# Patient Record
Sex: FEMALE | Race: WHITE | Employment: UNEMPLOYED | URBAN - METROPOLITAN AREA
[De-identification: names, ages, dates, MRNs, and addresses within clinical notes are randomized per-mention and may not be internally consistent; named-entity substitution may affect disease eponyms.]

---
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
## 2024-01-08 ENCOUNTER — TELEPHONE (OUTPATIENT)
Dept: OBGYN | Age: 24
End: 2024-01-08

## 2024-01-08 NOTE — TELEPHONE ENCOUNTER
Jailene Morales is scheduled for her 1st visit with you on 1/12.  She is due on 1/15/24.  I have requested records from [facility].  Do you want to order an US for her?  Thanks,  Camille

## 2024-01-10 ENCOUNTER — TELEPHONE (OUTPATIENT)
Dept: OBGYN | Age: 24
End: 2024-01-10

## 2024-01-10 DIAGNOSIS — Z3A.39 PREGNANCY WITH 39 COMPLETED WEEKS GESTATION: Primary | ICD-10-CM

## 2024-01-12 ENCOUNTER — INITIAL PRENATAL (OUTPATIENT)
Dept: OBGYN CLINIC | Age: 24
End: 2024-01-12
Payer: COMMERCIAL

## 2024-01-12 ENCOUNTER — HOSPITAL ENCOUNTER (OUTPATIENT)
Age: 24
Discharge: HOME | End: 2024-01-12
Payer: COMMERCIAL

## 2024-01-12 ENCOUNTER — ANCILLARY PROCEDURE (OUTPATIENT)
Dept: OBGYN CLINIC | Age: 24
DRG: 560 | End: 2024-01-12
Payer: COMMERCIAL

## 2024-01-12 ENCOUNTER — INITIAL PRENATAL (OUTPATIENT)
Dept: OBGYN | Age: 24
End: 2024-01-12
Payer: COMMERCIAL

## 2024-01-12 VITALS — WEIGHT: 167 LBS | DIASTOLIC BLOOD PRESSURE: 86 MMHG | SYSTOLIC BLOOD PRESSURE: 125 MMHG | HEART RATE: 109 BPM

## 2024-01-12 VITALS
HEIGHT: 63 IN | SYSTOLIC BLOOD PRESSURE: 117 MMHG | HEART RATE: 95 BPM | DIASTOLIC BLOOD PRESSURE: 84 MMHG | BODY MASS INDEX: 29.62 KG/M2 | WEIGHT: 167.2 LBS

## 2024-01-12 DIAGNOSIS — O09.33 LATE PRENATAL CARE AFFECTING PREGNANCY IN THIRD TRIMESTER: ICD-10-CM

## 2024-01-12 DIAGNOSIS — Z3A.39 39 WEEKS GESTATION OF PREGNANCY: Primary | ICD-10-CM

## 2024-01-12 DIAGNOSIS — Z34.90 SECOND PREGNANCY: ICD-10-CM

## 2024-01-12 DIAGNOSIS — Z3A.39 PREGNANCY WITH 39 COMPLETED WEEKS GESTATION: Primary | ICD-10-CM

## 2024-01-12 LAB
ABO + RH BLD: NORMAL
AMPHET UR QL SCN: NEGATIVE
BARBITURATES UR QL SCN: NEGATIVE
BENZODIAZ UR QL: NEGATIVE
BLOOD BANK SAMPLE EXPIRATION: NORMAL
BLOOD GROUP ANTIBODIES SERPL: NEGATIVE
BUPRENORPHINE UR QL: NEGATIVE
CANNABINOIDS UR QL SCN: NEGATIVE
COCAINE UR QL SCN: NEGATIVE
ERYTHROCYTE [DISTWIDTH] IN BLOOD BY AUTOMATED COUNT: 15.3 % (ref 11.5–15)
FENTANYL UR QL: NEGATIVE
GLUCOSE URINE, POC: NORMAL
HCT VFR BLD AUTO: 31.4 % (ref 34–48)
HGB BLD-MCNC: 9.7 G/DL (ref 11.5–15.5)
MCH RBC QN AUTO: 24.1 PG (ref 26–35)
MCHC RBC AUTO-ENTMCNC: 30.9 G/DL (ref 32–34.5)
MCV RBC AUTO: 78.1 FL (ref 80–99.9)
METHADONE UR QL: NEGATIVE
OPIATES UR QL SCN: NEGATIVE
OXYCODONE UR QL SCN: NEGATIVE
PCP UR QL SCN: NEGATIVE
PLATELET # BLD AUTO: 250 K/UL (ref 130–450)
PMV BLD AUTO: 9.3 FL (ref 7–12)
PROTEIN UA: NEGATIVE
RBC # BLD AUTO: 4.02 M/UL (ref 3.5–5.5)
TEST INFORMATION: NORMAL
WBC OTHER # BLD: 9.3 K/UL (ref 4.5–11.5)

## 2024-01-12 PROCEDURE — 81002 URINALYSIS NONAUTO W/O SCOPE: CPT | Performed by: OBSTETRICS & GYNECOLOGY

## 2024-01-12 PROCEDURE — 36415 COLL VENOUS BLD VENIPUNCTURE: CPT

## 2024-01-12 PROCEDURE — 86762 RUBELLA ANTIBODY: CPT

## 2024-01-12 PROCEDURE — 87340 HEPATITIS B SURFACE AG IA: CPT

## 2024-01-12 PROCEDURE — 99203 OFFICE O/P NEW LOW 30 MIN: CPT | Performed by: OBSTETRICS & GYNECOLOGY

## 2024-01-12 PROCEDURE — 85027 COMPLETE CBC AUTOMATED: CPT

## 2024-01-12 PROCEDURE — 99203 OFFICE O/P NEW LOW 30 MIN: CPT | Performed by: MIDWIFE

## 2024-01-12 PROCEDURE — 86850 RBC ANTIBODY SCREEN: CPT

## 2024-01-12 PROCEDURE — 87491 CHLMYD TRACH DNA AMP PROBE: CPT

## 2024-01-12 PROCEDURE — 87086 URINE CULTURE/COLONY COUNT: CPT

## 2024-01-12 PROCEDURE — 86787 VARICELLA-ZOSTER ANTIBODY: CPT

## 2024-01-12 PROCEDURE — 87389 HIV-1 AG W/HIV-1&-2 AB AG IA: CPT

## 2024-01-12 PROCEDURE — 86803 HEPATITIS C AB TEST: CPT

## 2024-01-12 PROCEDURE — 76820 UMBILICAL ARTERY ECHO: CPT | Performed by: OBSTETRICS & GYNECOLOGY

## 2024-01-12 PROCEDURE — 86901 BLOOD TYPING SEROLOGIC RH(D): CPT

## 2024-01-12 PROCEDURE — 87591 N.GONORRHOEAE DNA AMP PROB: CPT

## 2024-01-12 PROCEDURE — 86592 SYPHILIS TEST NON-TREP QUAL: CPT

## 2024-01-12 PROCEDURE — 76805 OB US >/= 14 WKS SNGL FETUS: CPT | Performed by: OBSTETRICS & GYNECOLOGY

## 2024-01-12 PROCEDURE — 80307 DRUG TEST PRSMV CHEM ANLYZR: CPT

## 2024-01-12 PROCEDURE — 76821 MIDDLE CEREBRAL ARTERY ECHO: CPT | Performed by: OBSTETRICS & GYNECOLOGY

## 2024-01-12 PROCEDURE — 76818 FETAL BIOPHYS PROFILE W/NST: CPT | Performed by: OBSTETRICS & GYNECOLOGY

## 2024-01-12 PROCEDURE — 83020 HEMOGLOBIN ELECTROPHORESIS: CPT

## 2024-01-12 PROCEDURE — 86900 BLOOD TYPING SEROLOGIC ABO: CPT

## 2024-01-12 SDOH — ECONOMIC STABILITY: FOOD INSECURITY: WITHIN THE PAST 12 MONTHS, THE FOOD YOU BOUGHT JUST DIDN'T LAST AND YOU DIDN'T HAVE MONEY TO GET MORE.: SOMETIMES TRUE

## 2024-01-12 SDOH — ECONOMIC STABILITY: HOUSING INSECURITY
IN THE LAST 12 MONTHS, WAS THERE A TIME WHEN YOU DID NOT HAVE A STEADY PLACE TO SLEEP OR SLEPT IN A SHELTER (INCLUDING NOW)?: NO

## 2024-01-12 SDOH — ECONOMIC STABILITY: FOOD INSECURITY: WITHIN THE PAST 12 MONTHS, YOU WORRIED THAT YOUR FOOD WOULD RUN OUT BEFORE YOU GOT MONEY TO BUY MORE.: SOMETIMES TRUE

## 2024-01-12 SDOH — ECONOMIC STABILITY: INCOME INSECURITY: HOW HARD IS IT FOR YOU TO PAY FOR THE VERY BASICS LIKE FOOD, HOUSING, MEDICAL CARE, AND HEATING?: SOMEWHAT HARD

## 2024-01-12 ASSESSMENT — PATIENT HEALTH QUESTIONNAIRE - PHQ9
SUM OF ALL RESPONSES TO PHQ QUESTIONS 1-9: 0
1. LITTLE INTEREST OR PLEASURE IN DOING THINGS: 0
SUM OF ALL RESPONSES TO PHQ9 QUESTIONS 1 & 2: 0
SUM OF ALL RESPONSES TO PHQ QUESTIONS 1-9: 0
2. FEELING DOWN, DEPRESSED OR HOPELESS: 0

## 2024-01-12 NOTE — PROGRESS NOTES
HISTORY OF PRESENT ILLNESS:  Jailene Kinney is a 23 y.o. female , Patient's last menstrual period was 2023.,  at 39w4d.   No prenatal care.  She went to a clinic in [place] and had a positive pregnancy test to get proof of pregnancy.  \"This year has been complicated\".  Spent some time out of state, didn't elaborate on that.  Has a 5-year-old son and has custody of him.  Partner present and appears supportive.  History of poor growth in 1st pregnancy but baby was 7#12.  12 hour labor without complications.  Denies postpartum hemorrhage (she is a redhead).  No  medications  No hx HSV  We discussed sending her to L&D today for induction, she doesn't want to do that, she is agreeable to induction on Monday.  Scheduled for 8am.  Discussed fetal kick counts at length and when to call  Agreeable to getting prenatal labs done today  GBS collected  Cervix 2/60/-2/anterior/soft.  Having some irregular contractions, not painful.   Had US and NST today at [facility]    Pregnancy complicated by:   Patient Active Problem List   Diagnosis Code    Maternal care for poor fetal growth, second trimester, not applicable or unspecified fetus O36.5920    Young primigravida in second trimester O09.612       PAST OB HISTORY  OB History          2    Para   1    Term   1            AB        Living   1         SAB        IAB        Ectopic        Molar        Multiple        Live Births   1                Past Medical History:          Diagnosis Date    Depression     Migraine        Past Surgical History:      History reviewed. No pertinent surgical history.    Social History:    TOBACCO:   reports that she has never smoked. She has never used smokeless tobacco.  ETOH:   reports no history of alcohol use.  DRUGS:   reports no history of drug use.  Family History:       Problem Relation Age of Onset    Transient ischemic attack  Mother        Medications Prior to Admission:  Not in a hospital admission.    Allergies:

## 2024-01-12 NOTE — PATIENT INSTRUCTIONS
Information about free and low-cost medicine programs.  Website: https://www.rxassist.org/      [employer] $4 Prescription Program  What they offer: Prescription Program includes up to a 30-day supply for $4 and a 90-day supply for $10 of some covered generic drugs at commonly prescribed dosages  Website: https://www.Progression/cp/4-prescriptions/9553461    [facility] Prescription Assistance Program  Phone: [phone]

## 2024-01-12 NOTE — PROGRESS NOTES
Patient is here today for ob new visit. Denies any vaginal bleeding, or leakage of fluids.  Cramping on and off.  Patient reports good fetal movement.

## 2024-01-12 NOTE — PROGRESS NOTES
OB new patient late prenatal care. Had U/S at [facility] today. Records requested at [facility]. Urine for glucose and protein obtained with negative results at [facility] appt.   Fetal heart tones obtained without difficulty. Culture obtained, labeled and sent to lab.  Discharge instructions given and patient directed to call the office with any questions or concerns and verbalized understanding.

## 2024-01-13 LAB
MICROORGANISM SPEC CULT: NO GROWTH
SPECIMEN DESCRIPTION: NORMAL

## 2024-01-15 ENCOUNTER — ANESTHESIA (OUTPATIENT)
Dept: LABOR AND DELIVERY | Age: 24
End: 2024-01-15

## 2024-01-15 ENCOUNTER — ANESTHESIA EVENT (OUTPATIENT)
Dept: LABOR AND DELIVERY | Age: 24
End: 2024-01-15

## 2024-01-15 ENCOUNTER — APPOINTMENT (OUTPATIENT)
Dept: LABOR AND DELIVERY | Age: 24
DRG: 560 | End: 2024-01-15
Payer: COMMERCIAL

## 2024-01-15 ENCOUNTER — HOSPITAL ENCOUNTER (INPATIENT)
Age: 24
LOS: 2 days | Discharge: HOME | DRG: 560 | End: 2024-01-17
Attending: OBSTETRICS & GYNECOLOGY | Admitting: OBSTETRICS & GYNECOLOGY
Payer: COMMERCIAL

## 2024-01-15 PROBLEM — O47.9 UTERINE CONTRACTIONS: Status: ACTIVE | Noted: 2024-01-15

## 2024-01-15 PROBLEM — O09.33 LIMITED PRENATAL CARE IN THIRD TRIMESTER: Status: ACTIVE | Noted: 2024-01-15

## 2024-01-15 PROBLEM — Z3A.40 40 WEEKS GESTATION OF PREGNANCY: Status: ACTIVE | Noted: 2024-01-15

## 2024-01-15 LAB
ABO + RH BLD: NORMAL
AMPHET UR QL SCN: NEGATIVE
ARM BAND NUMBER: NORMAL
BARBITURATES UR QL SCN: NEGATIVE
BENZODIAZ UR QL: NEGATIVE
BLOOD BANK SAMPLE EXPIRATION: NORMAL
BLOOD GROUP ANTIBODIES SERPL: NEGATIVE
BUPRENORPHINE UR QL: NEGATIVE
CANNABINOIDS UR QL SCN: NEGATIVE
COCAINE UR QL SCN: NEGATIVE
ERYTHROCYTE [DISTWIDTH] IN BLOOD BY AUTOMATED COUNT: 15.9 % (ref 11.5–15)
FENTANYL UR QL: NEGATIVE
HBV SURFACE AG SERPL QL IA: NONREACTIVE
HCT VFR BLD AUTO: 26.6 % (ref 34–48)
HCV AB SERPL QL IA: NONREACTIVE
HGB BLD-MCNC: 8.4 G/DL (ref 11.5–15.5)
HGB ELECTROPHORESIS INTERP: NORMAL
HIV 1+2 AB+HIV1 P24 AG SERPL QL IA: NONREACTIVE
MCH RBC QN AUTO: 24.3 PG (ref 26–35)
MCHC RBC AUTO-ENTMCNC: 31.6 G/DL (ref 32–34.5)
MCV RBC AUTO: 76.9 FL (ref 80–99.9)
METHADONE UR QL: NEGATIVE
OPIATES UR QL SCN: NEGATIVE
OXYCODONE UR QL SCN: NEGATIVE
PATHOLOGIST: NORMAL
PCP UR QL SCN: NEGATIVE
PLATELET # BLD AUTO: 254 K/UL (ref 130–450)
PMV BLD AUTO: 9.6 FL (ref 7–12)
RBC # BLD AUTO: 3.46 M/UL (ref 3.5–5.5)
RPR SER QL: NONREACTIVE
RUBV IGG SERPL QL IA: ABNORMAL IU/ML
TEST INFORMATION: NORMAL
VZV IGG SER QL IA: ABNORMAL
WBC OTHER # BLD: 7.2 K/UL (ref 4.5–11.5)

## 2024-01-15 PROCEDURE — 85027 COMPLETE CBC AUTOMATED: CPT

## 2024-01-15 PROCEDURE — 1220000001 HC SEMI PRIVATE L&D R&B

## 2024-01-15 PROCEDURE — 80307 DRUG TEST PRSMV CHEM ANLYZR: CPT

## 2024-01-15 PROCEDURE — 86900 BLOOD TYPING SEROLOGIC ABO: CPT

## 2024-01-15 PROCEDURE — 86901 BLOOD TYPING SEROLOGIC RH(D): CPT

## 2024-01-15 PROCEDURE — 99221 1ST HOSP IP/OBS SF/LOW 40: CPT | Performed by: PHYSICIAN ASSISTANT

## 2024-01-15 PROCEDURE — 6360000002 HC RX W HCPCS: Performed by: OBSTETRICS & GYNECOLOGY

## 2024-01-15 PROCEDURE — 2580000003 HC RX 258: Performed by: OBSTETRICS & GYNECOLOGY

## 2024-01-15 PROCEDURE — 86850 RBC ANTIBODY SCREEN: CPT

## 2024-01-15 RX ORDER — CARBOPROST TROMETHAMINE 250 UG/ML
250 INJECTION, SOLUTION INTRAMUSCULAR PRN
Status: DISCONTINUED | OUTPATIENT
Start: 2024-01-15 | End: 2024-01-17 | Stop reason: HOSPADM

## 2024-01-15 RX ORDER — ONDANSETRON 2 MG/ML
4 INJECTION INTRAMUSCULAR; INTRAVENOUS EVERY 6 HOURS PRN
Status: DISCONTINUED | OUTPATIENT
Start: 2024-01-15 | End: 2024-01-16 | Stop reason: HOSPADM

## 2024-01-15 RX ORDER — SODIUM CHLORIDE, SODIUM LACTATE, POTASSIUM CHLORIDE, AND CALCIUM CHLORIDE .6; .31; .03; .02 G/100ML; G/100ML; G/100ML; G/100ML
1000 INJECTION, SOLUTION INTRAVENOUS PRN
Status: DISCONTINUED | OUTPATIENT
Start: 2024-01-15 | End: 2024-01-17 | Stop reason: HOSPADM

## 2024-01-15 RX ORDER — SODIUM CHLORIDE, SODIUM LACTATE, POTASSIUM CHLORIDE, CALCIUM CHLORIDE 600; 310; 30; 20 MG/100ML; MG/100ML; MG/100ML; MG/100ML
INJECTION, SOLUTION INTRAVENOUS CONTINUOUS
Status: DISCONTINUED | OUTPATIENT
Start: 2024-01-15 | End: 2024-01-17 | Stop reason: HOSPADM

## 2024-01-15 RX ORDER — SODIUM CHLORIDE 9 MG/ML
25 INJECTION, SOLUTION INTRAVENOUS PRN
Status: DISCONTINUED | OUTPATIENT
Start: 2024-01-15 | End: 2024-01-17 | Stop reason: HOSPADM

## 2024-01-15 RX ORDER — SODIUM CHLORIDE, SODIUM LACTATE, POTASSIUM CHLORIDE, AND CALCIUM CHLORIDE .6; .31; .03; .02 G/100ML; G/100ML; G/100ML; G/100ML
500 INJECTION, SOLUTION INTRAVENOUS PRN
Status: DISCONTINUED | OUTPATIENT
Start: 2024-01-15 | End: 2024-01-17 | Stop reason: HOSPADM

## 2024-01-15 RX ORDER — PHYTONADIONE 1 MG/.5ML
INJECTION, EMULSION INTRAMUSCULAR; INTRAVENOUS; SUBCUTANEOUS
Status: DISPENSED
Start: 2024-01-15 | End: 2024-01-16

## 2024-01-15 RX ORDER — METHYLERGONOVINE MALEATE 0.2 MG/ML
200 INJECTION INTRAVENOUS PRN
Status: DISCONTINUED | OUTPATIENT
Start: 2024-01-15 | End: 2024-01-17 | Stop reason: HOSPADM

## 2024-01-15 RX ORDER — NALOXONE HYDROCHLORIDE 0.4 MG/ML
INJECTION, SOLUTION INTRAMUSCULAR; INTRAVENOUS; SUBCUTANEOUS PRN
Status: DISCONTINUED | OUTPATIENT
Start: 2024-01-15 | End: 2024-01-16 | Stop reason: HOSPADM

## 2024-01-15 RX ORDER — LABETALOL HYDROCHLORIDE 5 MG/ML
INJECTION, SOLUTION INTRAVENOUS
Status: DISPENSED
Start: 2024-01-15 | End: 2024-01-16

## 2024-01-15 RX ORDER — ERYTHROMYCIN 5 MG/G
OINTMENT OPHTHALMIC
Status: DISPENSED
Start: 2024-01-15 | End: 2024-01-16

## 2024-01-15 RX ORDER — ONDANSETRON 2 MG/ML
4 INJECTION INTRAMUSCULAR; INTRAVENOUS EVERY 6 HOURS PRN
Status: DISCONTINUED | OUTPATIENT
Start: 2024-01-15 | End: 2024-01-17 | Stop reason: HOSPADM

## 2024-01-15 RX ORDER — MISOPROSTOL 200 UG/1
800 TABLET ORAL PRN
Status: DISCONTINUED | OUTPATIENT
Start: 2024-01-15 | End: 2024-01-17 | Stop reason: HOSPADM

## 2024-01-15 RX ADMIN — SODIUM CHLORIDE, POTASSIUM CHLORIDE, SODIUM LACTATE AND CALCIUM CHLORIDE: 600; 310; 30; 20 INJECTION, SOLUTION INTRAVENOUS at 13:21

## 2024-01-15 RX ADMIN — Medication 1 MILLI-UNITS/MIN: at 15:41

## 2024-01-15 NOTE — H&P
Department of Obstetrics and Gynecology  Physician Assistant Obstetrics History and Physical      HISTORY OF PRESENT ILLNESS:      The patient is a 23 y.o.  2 parity 1 at 40 weeks' 0 days' gestation presents to L&D from home for scheduled IOL. She went to a clinic in [place] and had a positive pregnancy test to get proof of pregnancy. One prenatal visit 2024. History of poor fetal growth in first pregnancy, but baby was 7#12oz..    Current obstetric history is significant for:  1 prenatal visit  Anemia    Estimated Due Date:  01/15/2024  Contractions: Yes  Leaking of fluid: No  Bleeding:  No  Perceived fetal movement: Good        PAST OB HISTORY:  OB History    Para Term  AB Living   2 1 1     1   SAB IAB Ectopic Molar Multiple Live Births             1      # Outcome Date GA Lbr Dandre/2nd Weight Sex Delivery Anes PTL Lv   2 Current            1 Term 03/10/18 40w0d  3.515 kg (7 lb 12 oz) M Vag-Spont   KAYLI           Pre-eclampsia:  No      :  No      D & C:  No      Cerclage:  No      LEEP:  No      Myomectomy:  No       Labor: No    Past Medical History:  Denies hypertension, diabetes, asthma, cardiac or thyroid disease  Diagnosis Date    Depression     Migraine         Past Surgical History:    No past surgical history on file.     Social History:    Reports that she has never smoked. She has never used smokeless tobacco. She reports that she does not drink alcohol and does not use drugs.     Family History   Problem Relation Age of Onset    Transient ischemic attack  Mother        Blood type: pending  Antibody screen:   Lab Results   Component Value Date    LABANTI PENDING 01/15/2024     CBC:   Lab Results   Component Value Date    WBC 7.2 01/15/2024    HGB 8.4 (L) 01/15/2024    HCT 26.6 (L) 01/15/2024    MCV 76.9 (L) 01/15/2024     01/15/2024      Rubella: EQUIVOCAL  RPR: Non-reactive  Hepatitis B Surface Antigen: Non-reactive  Lab Results   Component Value Date

## 2024-01-15 NOTE — PROGRESS NOTES
40w0d here for IOL. Patient denies LOF, VB,CTX's. Good fetal movement.   Patient's first prenatal care appointment was 24.

## 2024-01-15 NOTE — ANESTHESIA PRE PROCEDURE
Department of Anesthesiology  Preprocedure Note       Name:  Jailene Kinney   Age:  23 y.o.  :  2000                                          MRN:  [id number]         Date:  1/15/2024      Surgeon: * No surgeons listed *    Procedure: * No procedures listed *    Medications prior to admission:   Prior to Admission medications    Medication Sig Start Date End Date Taking? Authorizing Provider   Prenatal Multivit-Min-Fe-FA (PRENATAL VITAMINS PO) Take by mouth    Provider, MD Kei       Current medications:    Current Facility-Administered Medications   Medication Dose Route Frequency Provider Last Rate Last Admin   • lactated ringers IV soln infusion   IntraVENous Continuous Hanigosky, Mandy, DO       • lactated ringers bolus bolus 500 mL  500 mL IntraVENous PRN Hanigosky, Mandy, DO        Or   • lactated ringers bolus bolus 1,000 mL  1,000 mL IntraVENous PRN Hanigosky, Mandy, DO       • 0.9 % sodium chloride infusion  25 mL IntraVENous PRN Hanigosky, Mandy, DO       • methylergonovine (METHERGINE) injection 200 mcg  200 mcg IntraMUSCular PRN Hanigosky, Mandy, DO       • carboprost (HEMABATE) injection 250 mcg  250 mcg IntraMUSCular PRN Hanigosky, Mandy, DO       • miSOPROStol (CYTOTEC) tablet 800 mcg  800 mcg Rectal PRN Hanigosky, Mandy, DO       • tranexamic acid (CYKLOKAPRON) 1,000 mg in sodium chloride 0.9 % 100 mL IVPB  1,000 mg IntraVENous Once PRN Hanigosky, Mandy, DO       • oxytocin (PITOCIN) 15 units in 250 mL infusion  87.3 mat-units/min IntraVENous Continuous PRN Hanigosky, Mandy, DO        And   • oxytocin (PITOCIN) 10 unit bolus from the bag  10 Units IntraVENous PRN Hanigosky, Mandy, DO       • ondansetron (ZOFRAN) injection 4 mg  4 mg IntraVENous Q6H PRN Hanigosky, Mandy, DO       • naloxone 0.4 mg in 10 mL sodium chloride syringe   IntraVENous PRN Cristina Way, DO       • ondansetron (ZOFRAN) injection 4 mg  4 mg IntraVENous Q6H PRN Rayna, Cristina, DO

## 2024-01-16 LAB
CHLAMYDIA DNA UR QL NAA+PROBE: NEGATIVE
CULTURE: NORMAL
N GONORRHOEA DNA UR QL NAA+PROBE: NEGATIVE
SPECIMEN DESCRIPTION: NORMAL
SPECIMEN DESCRIPTION: NORMAL

## 2024-01-16 PROCEDURE — 7200000001 HC VAGINAL DELIVERY

## 2024-01-16 PROCEDURE — 90715 TDAP VACCINE 7 YRS/> IM: CPT | Performed by: MIDWIFE

## 2024-01-16 PROCEDURE — 10907ZC DRAINAGE OF AMNIOTIC FLUID, THERAPEUTIC FROM PRODUCTS OF CONCEPTION, VIA NATURAL OR ARTIFICIAL OPENING: ICD-10-PCS | Performed by: MIDWIFE

## 2024-01-16 PROCEDURE — 90471 IMMUNIZATION ADMIN: CPT | Performed by: MIDWIFE

## 2024-01-16 PROCEDURE — 1220000000 HC SEMI PRIVATE OB R&B

## 2024-01-16 PROCEDURE — 2580000003 HC RX 258: Performed by: MIDWIFE

## 2024-01-16 PROCEDURE — 6360000002 HC RX W HCPCS: Performed by: OBSTETRICS & GYNECOLOGY

## 2024-01-16 PROCEDURE — 85461 HEMOGLOBIN FETAL: CPT

## 2024-01-16 PROCEDURE — 6360000002 HC RX W HCPCS: Performed by: MIDWIFE

## 2024-01-16 PROCEDURE — 6360000002 HC RX W HCPCS

## 2024-01-16 PROCEDURE — 2580000003 HC RX 258: Performed by: OBSTETRICS & GYNECOLOGY

## 2024-01-16 PROCEDURE — 59409 OBSTETRICAL CARE: CPT | Performed by: MIDWIFE

## 2024-01-16 PROCEDURE — 6370000000 HC RX 637 (ALT 250 FOR IP): Performed by: MIDWIFE

## 2024-01-16 RX ORDER — LIDOCAINE HYDROCHLORIDE 10 MG/ML
INJECTION, SOLUTION INFILTRATION; PERINEURAL
Status: DISPENSED
Start: 2024-01-16 | End: 2024-01-16

## 2024-01-16 RX ORDER — SODIUM CHLORIDE 0.9 % (FLUSH) 0.9 %
5-40 SYRINGE (ML) INJECTION PRN
Status: DISCONTINUED | OUTPATIENT
Start: 2024-01-16 | End: 2024-01-17 | Stop reason: HOSPADM

## 2024-01-16 RX ORDER — DOCUSATE SODIUM 100 MG/1
100 CAPSULE, LIQUID FILLED ORAL 2 TIMES DAILY
Status: DISCONTINUED | OUTPATIENT
Start: 2024-01-16 | End: 2024-01-17 | Stop reason: HOSPADM

## 2024-01-16 RX ORDER — SODIUM CHLORIDE 0.9 % (FLUSH) 0.9 %
5-40 SYRINGE (ML) INJECTION EVERY 12 HOURS SCHEDULED
Status: DISCONTINUED | OUTPATIENT
Start: 2024-01-16 | End: 2024-01-17 | Stop reason: HOSPADM

## 2024-01-16 RX ORDER — IBUPROFEN 800 MG/1
800 TABLET ORAL EVERY 8 HOURS SCHEDULED
Status: DISCONTINUED | OUTPATIENT
Start: 2024-01-16 | End: 2024-01-17 | Stop reason: HOSPADM

## 2024-01-16 RX ORDER — SODIUM CHLORIDE 9 MG/ML
INJECTION, SOLUTION INTRAVENOUS PRN
Status: DISCONTINUED | OUTPATIENT
Start: 2024-01-16 | End: 2024-01-17 | Stop reason: HOSPADM

## 2024-01-16 RX ORDER — MODIFIED LANOLIN
OINTMENT (GRAM) TOPICAL PRN
Status: DISCONTINUED | OUTPATIENT
Start: 2024-01-16 | End: 2024-01-17 | Stop reason: HOSPADM

## 2024-01-16 RX ORDER — ACETAMINOPHEN 500 MG
1000 TABLET ORAL EVERY 8 HOURS PRN
Status: DISCONTINUED | OUTPATIENT
Start: 2024-01-16 | End: 2024-01-17 | Stop reason: HOSPADM

## 2024-01-16 RX ADMIN — SODIUM CHLORIDE 200 MG: 9 INJECTION, SOLUTION INTRAVENOUS at 10:18

## 2024-01-16 RX ADMIN — Medication 166.7 ML: at 01:47

## 2024-01-16 RX ADMIN — Medication 2 MG: at 00:24

## 2024-01-16 RX ADMIN — DOCUSATE SODIUM 100 MG: 100 CAPSULE, LIQUID FILLED ORAL at 08:32

## 2024-01-16 RX ADMIN — BUTORPHANOL TARTRATE 2 MG: 2 INJECTION, SOLUTION INTRAMUSCULAR; INTRAVENOUS at 00:24

## 2024-01-16 RX ADMIN — SODIUM CHLORIDE, PRESERVATIVE FREE 10 ML: 5 INJECTION INTRAVENOUS at 08:33

## 2024-01-16 RX ADMIN — TETANUS TOXOID, REDUCED DIPHTHERIA TOXOID AND ACELLULAR PERTUSSIS VACCINE, ADSORBED 0.5 ML: 5; 2.5; 8; 8; 2.5 SUSPENSION INTRAMUSCULAR at 09:50

## 2024-01-16 RX ADMIN — IBUPROFEN 800 MG: 800 TABLET, FILM COATED ORAL at 22:02

## 2024-01-16 RX ADMIN — IBUPROFEN 800 MG: 800 TABLET, FILM COATED ORAL at 13:47

## 2024-01-16 RX ADMIN — SODIUM CHLORIDE, PRESERVATIVE FREE 10 ML: 5 INJECTION INTRAVENOUS at 10:18

## 2024-01-16 RX ADMIN — DOCUSATE SODIUM 100 MG: 100 CAPSULE, LIQUID FILLED ORAL at 22:02

## 2024-01-16 RX ADMIN — Medication 87.3 MILLI-UNITS/MIN: at 01:58

## 2024-01-16 ASSESSMENT — PAIN DESCRIPTION - DESCRIPTORS
DESCRIPTORS: ACHING;DISCOMFORT;SORE
DESCRIPTORS: CRAMPING;DISCOMFORT

## 2024-01-16 ASSESSMENT — PAIN DESCRIPTION - ORIENTATION
ORIENTATION: LOWER
ORIENTATION: LOWER

## 2024-01-16 ASSESSMENT — PAIN SCALES - GENERAL
PAINLEVEL_OUTOF10: 7
PAINLEVEL_OUTOF10: 2
PAINLEVEL_OUTOF10: 3
PAINLEVEL_OUTOF10: 1

## 2024-01-16 ASSESSMENT — PAIN - FUNCTIONAL ASSESSMENT
PAIN_FUNCTIONAL_ASSESSMENT: ACTIVITIES ARE NOT PREVENTED
PAIN_FUNCTIONAL_ASSESSMENT: ACTIVITIES ARE NOT PREVENTED

## 2024-01-16 ASSESSMENT — PAIN DESCRIPTION - LOCATION
LOCATION: ABDOMEN;PERINEUM
LOCATION: ABDOMEN

## 2024-01-16 NOTE — PLAN OF CARE
Problem: Postpartum  Goal: Experiences normal postpartum course  Description:  Postpartum OB-Pregnancy care plan goal which identifies if the mother is experiencing a normal postpartum course  Outcome: Progressing  Goal: Appropriate maternal -  bonding  Description:  Postpartum OB-Pregnancy care plan goal which identifies if the mother and  are bonding appropriately  Outcome: Progressing  Goal: Establishment of infant feeding pattern  Description:  Postpartum OB-Pregnancy care plan goal which identifies if the mother is establishing a feeding pattern with their   Outcome: Progressing  Goal: Incisions, wounds, or drain sites healing without S/S of infection  Outcome: Progressing     Problem: Pain  Goal: Verbalizes/displays adequate comfort level or baseline comfort level  Outcome: Progressing     Problem: Safety - Adult  Goal: Free from fall injury  Outcome: Progressing

## 2024-01-16 NOTE — PROGRESS NOTES
Delivery of a viable baby boy via  at 0138. Apgars 8/8, vitals stable. Infant placed skin to skin with patient.

## 2024-01-16 NOTE — PROGRESS NOTES
Dad laying on couch holding baby, reminded dad to please put baby in bassinet if he becomes tired. Dad and mom verbalized understanding.

## 2024-01-16 NOTE — PROGRESS NOTES
Jailene Kinney is a 23 y.o. female, , Patient's last menstrual period was 2023., Estimated Date of Delivery: 1/15/24, 40w0d.    Called to perform AROM on this patient.   History reviewed and there is no significant change except:   GBS is negative ,   Pitocin: 4, Contractions are every 2-3 min, FH: 135    VAGINAL EXAM:    Cervical dilatation: 5, Effacement: 90%, Presentation: vertex, Station: -2.    AROM performed.  Amniotic Fluid: clear

## 2024-01-16 NOTE — PROGRESS NOTES
Assumed patient care. Patient resting comfortably in bed. Denies needs or complaints at this time. Efm adjusted, call light within reach.

## 2024-01-16 NOTE — FLOWSHEET NOTE
Patient admitted to room 307 with father of infant at bedside. Patient oriented to room and floor and instructed to call phone number provided or use call light PRN. Admission packet and infant safety protocols and safe sleep reviewed with verbalized understanding. Patient is now resting in bed and infant taken to nursery.

## 2024-01-16 NOTE — L&D DELIVERY NOTE
José Kinney [[id number]]      Labor Events      Cervical Ripening Date/Time:        Rupture Date/Time:  1/15/24 22:44:00   Rupture Type: AROM, Intact  Fluid Color: Clear  Fluid Odor: None              Delivery Details      Delivery Date: 24 Delivery Time: 01:38:00                 Cord                  Placenta           Lacerations           Blood Loss  Mother: Jailene Kinney #[id number]     Start of Mother's Information      Delivery Blood Loss  01/15/24 1338 - 24 0158      None                 End of Mother's Information  Mother: Jailene Kinney #[id number]                Delivery Providers    Delivering clinician: Bridget Benedict APRN - CNM     Provider Role     Obstetrician    Robyn Heller, ALLISON Primary Nurse    Alisha Hugo RN Primary Pease Nurse     NICU Nurse     Neonatologist     Anesthesiologist     Nurse Anesthetist     Nurse Practitioner     Midwife     Nursery Nurse              Pease Assessment    Living Status: Living  Delivery Location Comment: LD5        Skin Color:   Heart Rate:   Reflex Irritability:   Muscle Tone:   Respiratory Effort:   Total:            1 Minute:    0    2    2    2    2    8         5 Minute:    0    2    2    2    2    8                                        Apgars Assigned By: SHERRI COOPER              Resuscitation    Method: Bulb Suction, Stimulation             Pease Measurements               Skin to Skin      Skin to Skin Initiation Date/Time: 24 01:39:00 EST     Skin to Skin With: Mother                Department of Obstetrics and Gynecology  Spontaneous Vaginal Delivery Note      Pre-operative Diagnosis:  Term pregnancy and Late Prenatal Care    Post-operative Diagnosis:  Living  infant(s) and Male    Infant Wt:   Birth Weight: N/A       APGARS:     APGAR One: N/A  APGAR Five: N/A       Anesthesia:  none      Delivery Summary: Spontaneous vaginal delivery of a male baby Nucahl x1, reduced, over intact perineum at 0138.   Airways of the baby cleared of all secretion with suction bulb as soon as the head was out from the vagina.  Placenta and menbranes, complete and normal were delivered at 0148.   Perineum: intact  Mother and Baby are in good condition.         Specimen:  cord gases      Estimated blood loss: 300cc              Condition:  mother and infant stable, skin to skin    Blood Type and Rh: A NEGATIVE        Rubella Immunity Status:   equivocal           Infant Feeding:    breast    Attending Attestation: I was present and scrubbed for the entire procedure.    MADAN Alcantara - MISAEL

## 2024-01-16 NOTE — LACTATION NOTE
Encouraged skin to skin and frequent attempts at breast to stimulate milk production. Instructed on normal infant behavior in the first 12-24 hours and importance of stimulating the baby frequently to eat during this time. Reviewed hand expression, and encouraged to hand express drops of colostrum when baby is sleepy. Instructed that baby may also feed 8-12 times a day- cluster feeding at times- as her milk supply is being established.  Instructed on benefits of skin to skin and avoidance of pacifier / artificial nipple use until breastfeeding is well established.  Educated on making sure infant has an open airway while breastfeeding and skin to skin. Instructed on hunger cues and waking techniques to try. Reviewed signs of adequate I & O; allow baby to feed ad storm and not to limit time at breast. Breastfeeding booklet provided with review of its contents. Offered assistance with latch, but patient declined. Encouraged to call with any concerns or for future feeds.    SANJUANITA Alfaro

## 2024-01-16 NOTE — PROGRESS NOTES
MISAEL Benedict updated on SVE, ctx pattern, and patient preferences for pain management. RN to continue current plan of care.

## 2024-01-16 NOTE — PROGRESS NOTES
Patient ambulatory to bathroom with standby. Unable to void at this time. Leatha care provided. Transferred to mom baby via wheelchair at this time.

## 2024-01-17 VITALS
HEART RATE: 81 BPM | TEMPERATURE: 97.7 F | DIASTOLIC BLOOD PRESSURE: 67 MMHG | WEIGHT: 167 LBS | OXYGEN SATURATION: 96 % | SYSTOLIC BLOOD PRESSURE: 106 MMHG | RESPIRATION RATE: 16 BRPM | BODY MASS INDEX: 29.59 KG/M2 | HEIGHT: 63 IN

## 2024-01-17 LAB
ERYTHROCYTE [DISTWIDTH] IN BLOOD BY AUTOMATED COUNT: 16.6 % (ref 11.5–15)
HCT VFR BLD AUTO: 25.8 % (ref 34–48)
HGB BLD-MCNC: 7.6 G/DL (ref 11.5–15.5)
MCH RBC QN AUTO: 23.7 PG (ref 26–35)
MCHC RBC AUTO-ENTMCNC: 29.5 G/DL (ref 32–34.5)
MCV RBC AUTO: 80.4 FL (ref 80–99.9)
PLATELET # BLD AUTO: 241 K/UL (ref 130–450)
PMV BLD AUTO: 9.4 FL (ref 7–12)
RBC # BLD AUTO: 3.21 M/UL (ref 3.5–5.5)
RESULT: NEGATIVE
WBC OTHER # BLD: 12.5 K/UL (ref 4.5–11.5)

## 2024-01-17 PROCEDURE — 2580000003 HC RX 258: Performed by: MIDWIFE

## 2024-01-17 PROCEDURE — 6360000002 HC RX W HCPCS: Performed by: OBSTETRICS & GYNECOLOGY

## 2024-01-17 PROCEDURE — 36415 COLL VENOUS BLD VENIPUNCTURE: CPT

## 2024-01-17 PROCEDURE — 85027 COMPLETE CBC AUTOMATED: CPT

## 2024-01-17 PROCEDURE — 2580000003 HC RX 258: Performed by: OBSTETRICS & GYNECOLOGY

## 2024-01-17 PROCEDURE — 96372 THER/PROPH/DIAG INJ SC/IM: CPT

## 2024-01-17 PROCEDURE — 6370000000 HC RX 637 (ALT 250 FOR IP): Performed by: MIDWIFE

## 2024-01-17 RX ORDER — IBUPROFEN 800 MG/1
800 TABLET ORAL EVERY 8 HOURS PRN
Qty: 24 TABLET | Refills: 0 | Status: SHIPPED | OUTPATIENT
Start: 2024-01-17

## 2024-01-17 RX ORDER — FERROUS SULFATE 325(65) MG
325 TABLET ORAL
Qty: 90 TABLET | Refills: 0 | Status: SHIPPED | OUTPATIENT
Start: 2024-01-17

## 2024-01-17 RX ADMIN — RHO(D) IMMUNE GLOBULIN (HUMAN) 300 MCG: 1500 SOLUTION INTRAMUSCULAR at 12:54

## 2024-01-17 RX ADMIN — IBUPROFEN 800 MG: 800 TABLET, FILM COATED ORAL at 11:13

## 2024-01-17 RX ADMIN — SODIUM CHLORIDE, PRESERVATIVE FREE 10 ML: 5 INJECTION INTRAVENOUS at 11:49

## 2024-01-17 RX ADMIN — SODIUM CHLORIDE 200 MG: 9 INJECTION, SOLUTION INTRAVENOUS at 12:12

## 2024-01-17 RX ADMIN — DOCUSATE SODIUM 100 MG: 100 CAPSULE, LIQUID FILLED ORAL at 11:14

## 2024-01-17 ASSESSMENT — PAIN DESCRIPTION - DESCRIPTORS: DESCRIPTORS: CRAMPING;DISCOMFORT;SORE

## 2024-01-17 ASSESSMENT — PAIN SCALES - GENERAL: PAINLEVEL_OUTOF10: 2

## 2024-01-17 ASSESSMENT — PAIN DESCRIPTION - ORIENTATION: ORIENTATION: LOWER

## 2024-01-17 ASSESSMENT — PAIN - FUNCTIONAL ASSESSMENT: PAIN_FUNCTIONAL_ASSESSMENT: ACTIVITIES ARE NOT PREVENTED

## 2024-01-17 ASSESSMENT — PAIN DESCRIPTION - ONSET: ONSET: GRADUAL

## 2024-01-17 ASSESSMENT — PAIN DESCRIPTION - PAIN TYPE: TYPE: ACUTE PAIN

## 2024-01-17 ASSESSMENT — PAIN DESCRIPTION - LOCATION: LOCATION: ABDOMEN

## 2024-01-17 ASSESSMENT — PAIN DESCRIPTION - FREQUENCY: FREQUENCY: INTERMITTENT

## 2024-01-17 NOTE — FLOWSHEET NOTE
This RN left confidential voicemail for DELON Ricketts notifying her of patient's PPD score of 10 today. Also notified DELON Ricketts that this RN will notify Dr. Olguin of score prior to discharge today.

## 2024-01-17 NOTE — LACTATION NOTE
Mom reports baby is nursing well so far. Encouraged frequent feeds at breast, hand expression and skin to skin to establish supply. Support provided and encouraged to call with any needs.

## 2024-01-17 NOTE — PLAN OF CARE
Problem: Postpartum  Goal: Experiences normal postpartum course  Description:  Postpartum OB-Pregnancy care plan goal which identifies if the mother is experiencing a normal postpartum course  2024 1355 by Laura Silverio RN  Outcome: Completed     Problem: Postpartum  Goal: Appropriate maternal -  bonding  Description:  Postpartum OB-Pregnancy care plan goal which identifies if the mother and  are bonding appropriately  2024 1355 by Laura Silverio RN  Outcome: Completed     Problem: Postpartum  Goal: Establishment of infant feeding pattern  Description:  Postpartum OB-Pregnancy care plan goal which identifies if the mother is establishing a feeding pattern with their   2024 135 by Laura Silverio RN  Outcome: Completed     Problem: Postpartum  Goal: Incisions, wounds, or drain sites healing without S/S of infection  2024 135 by Laura Silverio RN  Outcome: Completed     Problem: Pain  Goal: Verbalizes/displays adequate comfort level or baseline comfort level  2024 1355 by Laura Silverio RN  Outcome: Completed  Flowsheets (Taken 2024 1113)  Verbalizes/displays adequate comfort level or baseline comfort level:   Encourage patient to monitor pain and request assistance   Assess pain using appropriate pain scale   Administer analgesics based on type and severity of pain and evaluate response   Implement non-pharmacological measures as appropriate and evaluate response   Consider cultural and social influences on pain and pain management   Notify Licensed Independent Practitioner if interventions unsuccessful or patient reports new pain     Problem: Safety - Adult  Goal: Free from fall injury  2024 135 by Laura Silverio RN  Outcome: Completed

## 2024-01-17 NOTE — FLOWSHEET NOTE
Patient completed MADIHA Mother (postpartum patient) discharge education videos and signed and dated form. Form filed in patient's chart.

## 2024-01-17 NOTE — FLOWSHEET NOTE
Patient discharged to home in stable condition via wheelchair carrying infant on her lap in car seat. Patient and infant accompanied by FOB and patient's sister-in-law..

## 2024-01-17 NOTE — FLOWSHEET NOTE
Patient given a copy of her discharge instructions for her records. Final ID band check completed with patient and infant. Correct ID confirmed. Hugs tag disabled and removed.

## 2024-01-17 NOTE — DISCHARGE SUMMARY
which have NOT CHANGED    Details   Prenatal Multivit-Min-Fe-FA (PRENATAL VITAMINS PO) Take by mouth              Discharge to: Home  Follow up in 6-8 weeks      Comments

## 2024-01-17 NOTE — CARE COORDINATION
SW Discharge Planning   SW received consult for \" late, limited pnc\"     KATHERIN met with Jailene Kinney ( [phone]) mother to baby boy Carlos Mcgregor ( 1/16/[birth year]) and introduced self and role. Also present in the room was reported father of the baby, Alec Mcgregor, who Jailene gave SW permission to speak freely in front of. Jailene reported that she resides at the address listed in the chart with Balbir and their 5 year old son. 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 Per Jailene, she and Alec had trouble finding stable housing until close to the end of her pregnancy as well as transportation issues which were barriers to her receiving prenatal care. Jailene stated that they now have stable housing and denied any concerns with utilities. Jailene did report that the family only has one car while father of the baby works, however stated that she does know about medicaid transportation. Jailene stated that pediatric care will be with [facility], and we also discussed [facility] transportation program. Hollie Reported that she has all needed items including a car seat and pack and play. We discussed safe sleep practices. Jailene declined [facility] and reported that she is already involved with WIC. Jailene  denied any past or current history of children services involvement, legal issues, substance abuse, domestic violence or mental health diagnosis.  We discussed awareness of Post Partum Depression and encouraged contact with her OB if any problems arise.        PLAN    Baby CAN be discharged home when medically ready  Resources were discussed, KATHERIN will await cordstat       Electronically signed by DELON Crockett on 1/17/2024 at 10:17 AM

## 2024-01-17 NOTE — FLOWSHEET NOTE
Dr. Olguin called and notified that patient has a PPD score of 10, that she has been seen earlier today by DELON Ricketts, mom baby , and that this RN noted DELON Ricketts's note that states that PPD was discussed and that patient was instructed to contact OB provider if PPD issues develop. This RN also notified Dr. Olguin that patient refused MMR when offered for her rubella equivocal status done on 1/12/2024. No new orders received.

## 2024-01-17 NOTE — PROGRESS NOTES
Post-Partum Note    PPD#1     S:  Patient without complaints.  Breast feeding.  Lochia normal.  Ambulating.  Pt would like to go home today.      O: /67   Pulse 81   Temp 97.7 °F (36.5 °C) (Oral)   Resp 16   Ht 1.6 m (5' 3\")   Wt 75.8 kg (167 lb)   LMP 04/09/2023   SpO2 96%   Breastfeeding Unknown   BMI 29.58 kg/m²               ABD:    Uterus firm, non-tender.           EXT:     No Nina's.    LABS:   CBC:   Lab Results   Component Value Date/Time    WBC 12.5 01/17/2024 05:42 AM    RBC 3.21 01/17/2024 05:42 AM    HGB 7.6 01/17/2024 05:42 AM    HCT 25.8 01/17/2024 05:42 AM    MCV 80.4 01/17/2024 05:42 AM    MCH 23.7 01/17/2024 05:42 AM    MCHC 29.5 01/17/2024 05:42 AM    RDW 16.6 01/17/2024 05:42 AM     01/17/2024 05:42 AM    MPV 9.4 01/17/2024 05:42 AM       IMP:  1.  PPD#1, status-post vaginal delivery            2.   Chronic and acute blood loss anemia           3.   Rh neg           4.   Rubella: equivocal  Patient Active Problem List   Diagnosis    Maternal care for poor fetal growth, second trimester, not applicable or unspecified fetus    Young primigravida in second trimester    40 weeks gestation of pregnancy    Limited prenatal care in third trimester    Uterine contractions        Plan: 1.  Routine post-partum care           2.  Rhogam if indicated           3.  Repeat MMR           4.  Discharge to home           5.  Follow-up in office as directed           6.  Iron supplementation

## 2024-01-18 LAB
COMPONENT: NORMAL
STATUS OF UNITS: NORMAL
TRANSFUSION STATUS: NORMAL
UNIT DIVISION: 0
UNIT NUMBER: NORMAL

## 2024-01-20 NOTE — PROGRESS NOTES
MFM Note    The patient presented today for an ultrasound only.    Please see the ultrasound report for details.

## 2024-01-23 ENCOUNTER — TELEPHONE (OUTPATIENT)
Dept: OBGYN | Age: 24
End: 2024-01-23

## 2024-01-23 NOTE — TELEPHONE ENCOUNTER
----- Message from Lesley Smith APRN - CNM sent at 1/20/2024  4:28 PM EST -----  Anemia, needs feosol, order placed  Needs iron studies, order placed  She needs to let us know if she can't tolerate PO iron

## 2024-01-23 NOTE — PROGRESS NOTES
CLINICAL PHARMACY NOTE: MEDS TO BEDS    Total # of Prescriptions Filled: 2   The following medications were delivered to the patient:  Ibuprofen 800 mg  Iron 325 mg    Additional Documentation: